# Patient Record
(demographics unavailable — no encounter records)

---

## 2024-11-22 NOTE — HISTORY OF PRESENT ILLNESS
[FreeTextEntry1] :  56 year old  postmenopausal female presents for annual. Last seen in May 2023 and had negative pap/HPV. Has 2 prior NSVDs. States doing well and no complaints. Reports exercising regularly. Pt states UTD with mammo and colonoscopy. Notes 2 daughters graduated from college and are working.  OBHx:  x2  PMHx: HTN, Crohn's disease  FHx: Ovarian Cancer - Maternal Aunt  Allergies: NKDA

## 2024-11-22 NOTE — PLAN
[FreeTextEntry1] :  56 year old  postmenopausal female presents for annual.  -Pap UTD, pt prefers to be screened this yr -Mammo and colonoscopy UTD per pt -Advised vitamin D  F/u in 1 yr.

## 2024-11-22 NOTE — END OF VISIT
[FreeTextEntry3] :  I, Tabby Snow, acted as a scribe on behalf of Dr. Richa Powell M.D. on 11/22/2024.   All medical entries made by the scribe were at my, Dr. Richa Powell M.D., direction and personally dictated by me on 11/22/2024. I have reviewed the chart and agree that the record accurately reflects my personal performance of the history, physical exam, assessment and plan. I have also personally directed, reviewed, and agreed with the chart.